# Patient Record
Sex: MALE | Race: WHITE | URBAN - METROPOLITAN AREA
[De-identification: names, ages, dates, MRNs, and addresses within clinical notes are randomized per-mention and may not be internally consistent; named-entity substitution may affect disease eponyms.]

---

## 2023-07-18 ENCOUNTER — OFFICE (OUTPATIENT)
Dept: URBAN - METROPOLITAN AREA CLINIC 104 | Facility: CLINIC | Age: 83
Setting detail: OPHTHALMOLOGY
End: 2023-07-18
Payer: MEDICARE

## 2023-07-18 DIAGNOSIS — H01.002: ICD-10-CM

## 2023-07-18 DIAGNOSIS — H01.005: ICD-10-CM

## 2023-07-18 DIAGNOSIS — H01.001: ICD-10-CM

## 2023-07-18 DIAGNOSIS — Z79.84: ICD-10-CM

## 2023-07-18 DIAGNOSIS — E11.9: ICD-10-CM

## 2023-07-18 DIAGNOSIS — H35.40: ICD-10-CM

## 2023-07-18 DIAGNOSIS — Z96.1: ICD-10-CM

## 2023-07-18 DIAGNOSIS — H01.004: ICD-10-CM

## 2023-07-18 DIAGNOSIS — H43.813: ICD-10-CM

## 2023-07-18 PROCEDURE — 92014 COMPRE OPH EXAM EST PT 1/>: CPT | Performed by: SPECIALIST

## 2023-07-18 ASSESSMENT — KERATOMETRY
OS_K2POWER_DIOPTERS: 44.58
OD_K1POWER_DIOPTERS: 44.18
OD_AXISANGLE_DEGREES: 15
OD_K2POWER_DIOPTERS: 45.42
OS_AXISANGLE_DEGREES: 122
OS_K1POWER_DIOPTERS: 44.00

## 2023-07-18 ASSESSMENT — CONFRONTATIONAL VISUAL FIELD TEST (CVF)
OS_FINDINGS: FULL
OD_FINDINGS: FULL

## 2023-07-18 ASSESSMENT — REFRACTION_CURRENTRX
OD_SPHERE: +2.50
OD_OVR_VA: 20/
OS_SPHERE: +2.50
OS_OVR_VA: 20/
OS_VPRISM_DIRECTION: SV
OD_VPRISM_DIRECTION: SV

## 2023-07-18 ASSESSMENT — SPHEQUIV_DERIVED
OD_SPHEQUIV: 0
OS_SPHEQUIV: 0.25

## 2023-07-18 ASSESSMENT — VISUAL ACUITY
OD_BCVA: 20/20
OS_BCVA: 20/25

## 2023-07-18 ASSESSMENT — REFRACTION_AUTOREFRACTION
OS_AXIS: 74
OD_AXIS: 140
OS_CYLINDER: -1.00
OD_SPHERE: +0.25
OS_SPHERE: +0.75
OD_CYLINDER: -0.50

## 2023-07-18 ASSESSMENT — LID EXAM ASSESSMENTS
OS_BLEPHARITIS: LLL LUL 2+
OD_BLEPHARITIS: RLL RUL 2+

## 2023-07-18 ASSESSMENT — TONOMETRY
OD_IOP_MMHG: 15
OS_IOP_MMHG: 15

## 2023-07-18 ASSESSMENT — AXIALLENGTH_DERIVED
OS_AL: 23.21
OD_AL: 23.12

## 2024-03-25 ENCOUNTER — OFFICE (OUTPATIENT)
Dept: URBAN - METROPOLITAN AREA CLINIC 104 | Facility: CLINIC | Age: 84
Setting detail: OPHTHALMOLOGY
End: 2024-03-25
Payer: MEDICARE

## 2024-03-25 DIAGNOSIS — Z96.1: ICD-10-CM

## 2024-03-25 DIAGNOSIS — H01.005: ICD-10-CM

## 2024-03-25 DIAGNOSIS — Z79.84: ICD-10-CM

## 2024-03-25 DIAGNOSIS — H53.2: ICD-10-CM

## 2024-03-25 DIAGNOSIS — H43.813: ICD-10-CM

## 2024-03-25 DIAGNOSIS — H01.004: ICD-10-CM

## 2024-03-25 DIAGNOSIS — H01.001: ICD-10-CM

## 2024-03-25 DIAGNOSIS — H35.40: ICD-10-CM

## 2024-03-25 DIAGNOSIS — E11.9: ICD-10-CM

## 2024-03-25 DIAGNOSIS — H01.002: ICD-10-CM

## 2024-03-25 PROCEDURE — 99214 OFFICE O/P EST MOD 30 MIN: CPT | Performed by: SPECIALIST

## 2024-03-25 ASSESSMENT — LID EXAM ASSESSMENTS
OS_BLEPHARITIS: LLL LUL 2+
OD_BLEPHARITIS: RLL RUL 2+

## 2024-03-25 ASSESSMENT — REFRACTION_CURRENTRX
OD_OVR_VA: 20/
OS_OVR_VA: 20/

## 2024-04-01 PROBLEM — H53.2 DIPLOPIA: Status: ACTIVE | Noted: 2024-03-25

## 2024-04-04 ENCOUNTER — OFFICE (OUTPATIENT)
Facility: LOCATION | Age: 84
Setting detail: OPHTHALMOLOGY
End: 2024-04-04
Payer: MEDICARE

## 2024-04-04 DIAGNOSIS — H50.22: ICD-10-CM

## 2024-04-04 DIAGNOSIS — H49.12: ICD-10-CM

## 2024-04-04 DIAGNOSIS — E11.9: ICD-10-CM

## 2024-04-04 DIAGNOSIS — H35.033: ICD-10-CM

## 2024-04-04 DIAGNOSIS — H53.2: ICD-10-CM

## 2024-04-04 DIAGNOSIS — H53.433: ICD-10-CM

## 2024-04-04 DIAGNOSIS — Z79.84: ICD-10-CM

## 2024-04-04 DIAGNOSIS — H43.813: ICD-10-CM

## 2024-04-04 DIAGNOSIS — H35.40: ICD-10-CM

## 2024-04-04 PROCEDURE — 92250 FUNDUS PHOTOGRAPHY W/I&R: CPT | Performed by: OPHTHALMOLOGY

## 2024-04-04 PROCEDURE — 92060 SENSORIMOTOR EXAMINATION: CPT | Performed by: OPHTHALMOLOGY

## 2024-04-04 PROCEDURE — 92083 EXTENDED VISUAL FIELD XM: CPT | Performed by: OPHTHALMOLOGY

## 2024-04-04 PROCEDURE — 92014 COMPRE OPH EXAM EST PT 1/>: CPT | Performed by: OPHTHALMOLOGY

## 2024-04-04 ASSESSMENT — LID EXAM ASSESSMENTS
OS_BLEPHARITIS: LLL LUL 2+
OD_BLEPHARITIS: RLL RUL 2+

## 2024-04-18 ENCOUNTER — OFFICE (OUTPATIENT)
Facility: LOCATION | Age: 84
Setting detail: OPHTHALMOLOGY
End: 2024-04-18
Payer: MEDICARE

## 2024-04-18 DIAGNOSIS — H53.2: ICD-10-CM

## 2024-04-18 DIAGNOSIS — E11.9: ICD-10-CM

## 2024-04-18 DIAGNOSIS — H52.4: ICD-10-CM

## 2024-04-18 DIAGNOSIS — H50.22: ICD-10-CM

## 2024-04-18 DIAGNOSIS — H49.12: ICD-10-CM

## 2024-04-18 DIAGNOSIS — Z79.84: ICD-10-CM

## 2024-04-18 PROBLEM — H53.433 ARCUATE DEFECT; BOTH EYES: Status: ACTIVE | Noted: 2024-04-04

## 2024-04-18 PROBLEM — H35.033 HYPERTENSIVE RETINOPATHY; BOTH EYES: Status: ACTIVE | Noted: 2024-04-04

## 2024-04-18 PROCEDURE — 99213 OFFICE O/P EST LOW 20 MIN: CPT | Performed by: OPHTHALMOLOGY

## 2024-04-18 PROCEDURE — 92015 DETERMINE REFRACTIVE STATE: CPT | Performed by: OPHTHALMOLOGY

## 2024-04-18 PROCEDURE — 92060 SENSORIMOTOR EXAMINATION: CPT | Performed by: OPHTHALMOLOGY

## 2024-04-18 ASSESSMENT — LID EXAM ASSESSMENTS
OS_BLEPHARITIS: LLL LUL 2+
OD_BLEPHARITIS: RLL RUL 2+

## 2024-05-23 ENCOUNTER — OFFICE (OUTPATIENT)
Facility: LOCATION | Age: 84
Setting detail: OPHTHALMOLOGY
End: 2024-05-23
Payer: MEDICARE

## 2024-05-23 DIAGNOSIS — H50.22: ICD-10-CM

## 2024-05-23 DIAGNOSIS — H49.12: ICD-10-CM

## 2024-05-23 DIAGNOSIS — H53.2: ICD-10-CM

## 2024-05-23 DIAGNOSIS — Z96.1: ICD-10-CM

## 2024-05-23 PROBLEM — H52.223 ASTIGMATISM, REGULAR; BOTH EYES: Status: ACTIVE | Noted: 2024-05-23

## 2024-05-23 PROCEDURE — 99213 OFFICE O/P EST LOW 20 MIN: CPT | Performed by: OPHTHALMOLOGY

## 2024-05-23 ASSESSMENT — LID EXAM ASSESSMENTS
OD_BLEPHARITIS: RLL RUL 2+
OS_BLEPHARITIS: LLL LUL 2+

## 2024-07-23 ENCOUNTER — OFFICE (OUTPATIENT)
Dept: URBAN - METROPOLITAN AREA CLINIC 104 | Facility: CLINIC | Age: 84
Setting detail: OPHTHALMOLOGY
End: 2024-07-23
Payer: MEDICARE

## 2024-07-23 DIAGNOSIS — H01.005: ICD-10-CM

## 2024-07-23 DIAGNOSIS — Z96.1: ICD-10-CM

## 2024-07-23 DIAGNOSIS — H01.002: ICD-10-CM

## 2024-07-23 DIAGNOSIS — E11.9: ICD-10-CM

## 2024-07-23 DIAGNOSIS — H01.004: ICD-10-CM

## 2024-07-23 DIAGNOSIS — H43.813: ICD-10-CM

## 2024-07-23 DIAGNOSIS — H35.033: ICD-10-CM

## 2024-07-23 DIAGNOSIS — H01.001: ICD-10-CM

## 2024-07-23 PROCEDURE — 92014 COMPRE OPH EXAM EST PT 1/>: CPT | Performed by: SPECIALIST

## 2024-07-23 ASSESSMENT — CONFRONTATIONAL VISUAL FIELD TEST (CVF)
OD_FINDINGS: FULL
OS_FINDINGS: FULL

## 2024-07-23 ASSESSMENT — LID EXAM ASSESSMENTS
OD_BLEPHARITIS: RLL RUL 2+
OS_BLEPHARITIS: LLL LUL 2+

## 2024-08-01 ENCOUNTER — OFFICE (OUTPATIENT)
Facility: LOCATION | Age: 84
Setting detail: OPHTHALMOLOGY
End: 2024-08-01
Payer: MEDICARE

## 2024-08-01 DIAGNOSIS — H50.22: ICD-10-CM

## 2024-08-01 DIAGNOSIS — H49.12: ICD-10-CM

## 2024-08-01 DIAGNOSIS — H53.2: ICD-10-CM

## 2024-08-01 PROCEDURE — 92060 SENSORIMOTOR EXAMINATION: CPT | Performed by: OPHTHALMOLOGY

## 2024-08-01 PROCEDURE — 92012 INTRM OPH EXAM EST PATIENT: CPT | Performed by: OPHTHALMOLOGY

## 2024-08-01 ASSESSMENT — LID EXAM ASSESSMENTS
OS_BLEPHARITIS: LLL LUL 2+
OD_BLEPHARITIS: RLL RUL 2+

## 2024-08-01 ASSESSMENT — CONFRONTATIONAL VISUAL FIELD TEST (CVF)
OS_FINDINGS: FULL
OD_FINDINGS: FULL

## 2024-10-10 ENCOUNTER — OFFICE (OUTPATIENT)
Facility: LOCATION | Age: 84
Setting detail: OPHTHALMOLOGY
End: 2024-10-10
Payer: MEDICARE

## 2024-10-10 DIAGNOSIS — H53.2: ICD-10-CM

## 2024-10-10 DIAGNOSIS — H49.12: ICD-10-CM

## 2024-10-10 DIAGNOSIS — H50.22: ICD-10-CM

## 2024-10-10 PROCEDURE — 92060 SENSORIMOTOR EXAMINATION: CPT | Performed by: OPHTHALMOLOGY

## 2024-10-10 PROCEDURE — 99213 OFFICE O/P EST LOW 20 MIN: CPT | Performed by: OPHTHALMOLOGY

## 2024-10-10 ASSESSMENT — REFRACTION_AUTOREFRACTION
OD_CYLINDER: -0.75
OD_AXIS: 137
OS_SPHERE: -0.50
OD_SPHERE: +0.75
OS_AXIS: 024
OS_CYLINDER: -0.75

## 2024-10-10 ASSESSMENT — REFRACTION_CURRENTRX
OS_AXIS: 024
OD_VPRISM_DIRECTION: SV
OS_CYLINDER: -0.75
OS_VPRISM_DIRECTION: SV
OD_CYLINDER: -0.75
OD_AXIS: 137
OS_OVR_VA: 20/
OS_VPRISM_DIRECTION: SV
OD_OVR_VA: 20/
OS_SPHERE: +2.50
OD_SPHERE: +2.50
OS_OVR_VA: 20/
OS_CYLINDER: -0.25
OD_VPRISM_DIRECTION: SV
OD_SPHERE: +0.50
OS_SPHERE: -0.50
OS_AXIS: 176
OD_OVR_VA: 20/

## 2024-10-10 ASSESSMENT — REFRACTION_TRIALFRAME
OS_ADD: +2.50
OS_VPRISM: BD
OD_ADD: +2.50
OS_SPHERE: PLANO
OD_SPHERE: PLANO
OD_VA1: 20/20
OS_VA1: 20/20

## 2024-10-10 ASSESSMENT — REFRACTION_MANIFEST
OS_VPRISM: BD
OD_AXIS: 135
OD_VPRISM: BU
OD_VA1: 20/20
OS_SPHERE: -0.50
OS_CYLINDER: -0.75
OS_VA1: 20/20
OS_AXIS: 020
OD_SPHERE: +0.50
OD_CYLINDER: -0.75

## 2024-10-10 ASSESSMENT — KERATOMETRY
METHOD_AUTO_MANUAL: AUTO
OS_K2POWER_DIOPTERS: 45.50
OD_K2POWER_DIOPTERS: 45.75
OD_K1POWER_DIOPTERS: 44.75
OS_AXISANGLE_DEGREES: 079
OD_AXISANGLE_DEGREES: 077
OS_K1POWER_DIOPTERS: 45.00

## 2024-10-10 ASSESSMENT — LID EXAM ASSESSMENTS
OD_BLEPHARITIS: RLL RUL 2+
OS_BLEPHARITIS: LLL LUL 2+

## 2024-10-10 ASSESSMENT — VISUAL ACUITY
OS_BCVA: 20/20
OD_BCVA: 20/20

## 2025-05-01 PROBLEM — Z00.00 ENCOUNTER FOR PREVENTIVE HEALTH EXAMINATION: Status: ACTIVE | Noted: 2025-05-01

## 2025-05-07 ENCOUNTER — APPOINTMENT (OUTPATIENT)
Dept: SURGICAL ONCOLOGY | Facility: CLINIC | Age: 85
End: 2025-05-07
Payer: MEDICARE

## 2025-05-07 VITALS
BODY MASS INDEX: 28.16 KG/M2 | HEART RATE: 66 BPM | SYSTOLIC BLOOD PRESSURE: 150 MMHG | WEIGHT: 169 LBS | DIASTOLIC BLOOD PRESSURE: 60 MMHG | HEIGHT: 65 IN | OXYGEN SATURATION: 97 %

## 2025-05-07 DIAGNOSIS — Z78.9 OTHER SPECIFIED HEALTH STATUS: ICD-10-CM

## 2025-05-07 DIAGNOSIS — Z86.39 PERSONAL HISTORY OF OTHER ENDOCRINE, NUTRITIONAL AND METABOLIC DISEASE: ICD-10-CM

## 2025-05-07 DIAGNOSIS — Z86.79 PERSONAL HISTORY OF OTHER DISEASES OF THE CIRCULATORY SYSTEM: ICD-10-CM

## 2025-05-07 DIAGNOSIS — D03.59 MELANOMA IN SITU OF OTHER PART OF TRUNK: ICD-10-CM

## 2025-05-07 DIAGNOSIS — C43.59 MALIGNANT MELANOMA OF OTHER PART OF TRUNK: ICD-10-CM

## 2025-05-07 PROCEDURE — 99205 OFFICE O/P NEW HI 60 MIN: CPT

## 2025-05-07 RX ORDER — HYDROCHLOROTHIAZIDE 25 MG/1
25 TABLET ORAL
Refills: 0 | Status: ACTIVE | COMMUNITY

## 2025-05-07 RX ORDER — HYDRALAZINE/HYDROCHLOROTHIAZID 50 MG-50MG
CAPSULE ORAL
Refills: 0 | Status: ACTIVE | COMMUNITY

## 2025-05-07 RX ORDER — ASPIRIN 81 MG
81 TABLET,CHEWABLE ORAL
Refills: 0 | Status: ACTIVE | COMMUNITY

## 2025-05-07 RX ORDER — PANTOPRAZOLE 40 MG/1
40 TABLET, DELAYED RELEASE ORAL
Refills: 0 | Status: ACTIVE | COMMUNITY

## 2025-05-07 RX ORDER — LABETALOL HYDROCHLORIDE 100 MG/1
100 TABLET, FILM COATED ORAL
Refills: 0 | Status: ACTIVE | COMMUNITY

## 2025-05-07 RX ORDER — VALSARTAN 40 MG/1
TABLET, COATED ORAL
Refills: 0 | Status: ACTIVE | COMMUNITY

## 2025-05-09 ENCOUNTER — OUTPATIENT (OUTPATIENT)
Dept: OUTPATIENT SERVICES | Facility: HOSPITAL | Age: 85
LOS: 1 days | End: 2025-05-09
Payer: MEDICARE

## 2025-05-09 ENCOUNTER — RESULT REVIEW (OUTPATIENT)
Age: 85
End: 2025-05-09

## 2025-05-09 DIAGNOSIS — C80.1 MALIGNANT (PRIMARY) NEOPLASM, UNSPECIFIED: ICD-10-CM

## 2025-05-09 PROCEDURE — 88321 CONSLTJ&REPRT SLD PREP ELSWR: CPT

## 2025-05-12 LAB — SURGICAL PATHOLOGY STUDY: SIGNIFICANT CHANGE UP

## 2025-05-19 RX ORDER — SODIUM CHLORIDE 9 G/1000ML
1000 INJECTION, SOLUTION INTRAVENOUS
Refills: 0 | Status: DISCONTINUED | OUTPATIENT
Start: 2025-05-20 | End: 2025-06-03

## 2025-05-20 ENCOUNTER — TRANSCRIPTION ENCOUNTER (OUTPATIENT)
Age: 85
End: 2025-05-20

## 2025-05-20 ENCOUNTER — APPOINTMENT (OUTPATIENT)
Dept: SURGICAL ONCOLOGY | Facility: HOSPITAL | Age: 85
End: 2025-05-20

## 2025-05-20 ENCOUNTER — OUTPATIENT (OUTPATIENT)
Dept: OUTPATIENT SERVICES | Facility: HOSPITAL | Age: 85
LOS: 1 days | End: 2025-05-20
Payer: MEDICARE

## 2025-05-20 VITALS
DIASTOLIC BLOOD PRESSURE: 66 MMHG | HEIGHT: 65 IN | HEART RATE: 65 BPM | SYSTOLIC BLOOD PRESSURE: 171 MMHG | RESPIRATION RATE: 15 BRPM | WEIGHT: 167.99 LBS | OXYGEN SATURATION: 98 % | TEMPERATURE: 97 F

## 2025-05-20 VITALS
DIASTOLIC BLOOD PRESSURE: 63 MMHG | OXYGEN SATURATION: 99 % | TEMPERATURE: 97 F | HEART RATE: 64 BPM | SYSTOLIC BLOOD PRESSURE: 152 MMHG | RESPIRATION RATE: 18 BRPM

## 2025-05-20 DIAGNOSIS — Z90.49 ACQUIRED ABSENCE OF OTHER SPECIFIED PARTS OF DIGESTIVE TRACT: Chronic | ICD-10-CM

## 2025-05-20 DIAGNOSIS — T14.8XXA OTHER INJURY OF UNSPECIFIED BODY REGION, INITIAL ENCOUNTER: ICD-10-CM

## 2025-05-20 DIAGNOSIS — C43.9 MALIGNANT MELANOMA OF SKIN, UNSPECIFIED: ICD-10-CM

## 2025-05-20 DIAGNOSIS — C43.59 MALIGNANT MELANOMA OF OTHER PART OF TRUNK: ICD-10-CM

## 2025-05-20 DIAGNOSIS — Z98.890 OTHER SPECIFIED POSTPROCEDURAL STATES: Chronic | ICD-10-CM

## 2025-05-20 LAB
GLUCOSE BLDC GLUCOMTR-MCNC: 134 MG/DL — HIGH (ref 70–99)
GLUCOSE BLDC GLUCOMTR-MCNC: 149 MG/DL — HIGH (ref 70–99)
GLUCOSE BLDC GLUCOMTR-MCNC: 205 MG/DL — HIGH (ref 70–99)

## 2025-05-20 PROCEDURE — 38790 INJECT FOR LYMPHATIC X-RAY: CPT | Mod: 50,XU

## 2025-05-20 PROCEDURE — 88305 TISSUE EXAM BY PATHOLOGIST: CPT

## 2025-05-20 PROCEDURE — 88342 IMHCHEM/IMCYTCHM 1ST ANTB: CPT | Mod: 26

## 2025-05-20 PROCEDURE — 10140 I&D HMTMA SEROMA/FLUID COLLJ: CPT | Mod: XU

## 2025-05-20 PROCEDURE — A9541: CPT

## 2025-05-20 PROCEDURE — C9399: CPT

## 2025-05-20 PROCEDURE — 21936 RESECT BACK TUM 5 CM/>: CPT

## 2025-05-20 PROCEDURE — 38792 RA TRACER ID OF SENTINL NODE: CPT | Mod: 50,XU

## 2025-05-20 PROCEDURE — 88307 TISSUE EXAM BY PATHOLOGIST: CPT

## 2025-05-20 PROCEDURE — 88305 TISSUE EXAM BY PATHOLOGIST: CPT | Mod: 26

## 2025-05-20 PROCEDURE — 38900 IO MAP OF SENT LYMPH NODE: CPT | Mod: 50

## 2025-05-20 PROCEDURE — 38900 IO MAP OF SENT LYMPH NODE: CPT | Mod: AS,50

## 2025-05-20 PROCEDURE — ZZZZZ: CPT

## 2025-05-20 PROCEDURE — 38510 BIOPSY/REMOVAL LYMPH NODES: CPT

## 2025-05-20 PROCEDURE — 88307 TISSUE EXAM BY PATHOLOGIST: CPT | Mod: 26

## 2025-05-20 PROCEDURE — 14001 TIS TRNFR TRUNK 10.1-30SQCM: CPT

## 2025-05-20 PROCEDURE — 38510 BIOPSY/REMOVAL LYMPH NODES: CPT | Mod: 50

## 2025-05-20 PROCEDURE — 38308 INCISION OF LYMPH CHANNELS: CPT | Mod: AS

## 2025-05-20 PROCEDURE — C1889: CPT

## 2025-05-20 PROCEDURE — 38308 INCISION OF LYMPH CHANNELS: CPT

## 2025-05-20 PROCEDURE — 78195 LYMPH SYSTEM IMAGING: CPT

## 2025-05-20 PROCEDURE — 88342 IMHCHEM/IMCYTCHM 1ST ANTB: CPT

## 2025-05-20 PROCEDURE — 88341 IMHCHEM/IMCYTCHM EA ADD ANTB: CPT

## 2025-05-20 PROCEDURE — 21936 RESECT BACK TUM 5 CM/>: CPT | Mod: AS

## 2025-05-20 PROCEDURE — 78195 LYMPH SYSTEM IMAGING: CPT | Mod: 26

## 2025-05-20 PROCEDURE — 82962 GLUCOSE BLOOD TEST: CPT

## 2025-05-20 PROCEDURE — 88341 IMHCHEM/IMCYTCHM EA ADD ANTB: CPT | Mod: 26

## 2025-05-20 DEVICE — SURGICEL FIBRILLAR 4 X 4": Type: IMPLANTABLE DEVICE | Status: FUNCTIONAL

## 2025-05-20 DEVICE — AGENT HEMOSTATIC HEMOBLAST 1.65G 10CM: Type: IMPLANTABLE DEVICE | Status: FUNCTIONAL

## 2025-05-20 DEVICE — CLIP APPLIER ETHICON LIGACLIP 11.5" MEDIUM: Type: IMPLANTABLE DEVICE | Status: FUNCTIONAL

## 2025-05-20 RX ORDER — ONDANSETRON HCL/PF 4 MG/2 ML
4 VIAL (ML) INJECTION ONCE
Refills: 0 | Status: DISCONTINUED | OUTPATIENT
Start: 2025-05-20 | End: 2025-05-20

## 2025-05-20 RX ORDER — MELATONIN 5 MG
1 TABLET ORAL
Refills: 0 | DISCHARGE

## 2025-05-20 RX ORDER — SODIUM CHLORIDE 9 G/1000ML
1000 INJECTION, SOLUTION INTRAVENOUS
Refills: 0 | Status: DISCONTINUED | OUTPATIENT
Start: 2025-05-20 | End: 2025-05-20

## 2025-05-20 RX ORDER — HYDROMORPHONE/SOD CHLOR,ISO/PF 2 MG/10 ML
0.25 SYRINGE (ML) INJECTION
Refills: 0 | Status: DISCONTINUED | OUTPATIENT
Start: 2025-05-20 | End: 2025-05-20

## 2025-05-20 RX ORDER — ASPIRIN 325 MG
1 TABLET ORAL
Refills: 0 | DISCHARGE

## 2025-05-20 RX ORDER — CEFADROXIL 500 MG/1
1 CAPSULE ORAL
Qty: 14 | Refills: 0
Start: 2025-05-20 | End: 2025-05-26

## 2025-05-20 RX ORDER — HYDROMORPHONE/SOD CHLOR,ISO/PF 2 MG/10 ML
0.5 SYRINGE (ML) INJECTION
Refills: 0 | Status: DISCONTINUED | OUTPATIENT
Start: 2025-05-20 | End: 2025-05-20

## 2025-05-20 RX ORDER — B1/B2/B3/B5/B6/B12/VIT C/FOLIC 500-0.5 MG
0 TABLET ORAL
Refills: 0 | DISCHARGE

## 2025-05-20 RX ORDER — ACETAMINOPHEN 500 MG/5ML
2 LIQUID (ML) ORAL
Qty: 0 | Refills: 0 | DISCHARGE

## 2025-05-20 RX ORDER — B1/B2/B3/B5/B6/B12/VIT C/FOLIC 500-0.5 MG
1 TABLET ORAL
Refills: 0 | DISCHARGE

## 2025-05-20 RX ORDER — LABETALOL HYDROCHLORIDE 200 MG/1
1 TABLET, FILM COATED ORAL
Refills: 0 | DISCHARGE

## 2025-05-20 RX ADMIN — SODIUM CHLORIDE 50 MILLILITER(S): 9 INJECTION, SOLUTION INTRAVENOUS at 07:30

## 2025-05-20 NOTE — PROGRESS NOTE ADULT - ASSESSMENT
84 yo male with pmhx of HTN, DM, here for excision of melanoma from back with Neck lymph node biopsy and return to OR for evacuation of hematoma.  Pt currently in PACU, would like to go home and feels comfortable going home.  Family at bedside will be with him this evening.

## 2025-05-20 NOTE — ASU DISCHARGE PLAN (ADULT/PEDIATRIC) - NS MD DC FALL RISK RISK
For information on Fall & Injury Prevention, visit: [email or web address] OR  [email or web address] OR  https://www.cdc.gov/steadi/patient.html

## 2025-05-20 NOTE — H&P ADULT - NSHPPHYSICALEXAM_GEN_ALL_CORE
T(C): 36.6 (05-20-25 @ 14:25), Max: 36.6 (05-20-25 @ 14:25)  HR: 60 (05-20-25 @ 14:25) (55 - 63)  BP: 170/69 (05-20-25 @ 14:25) (160/68 - 178/70)  RR: 14 (05-20-25 @ 14:25) (12 - 15)  SpO2: 99% (05-20-25 @ 14:25) (98% - 100%)    CONSTITUTIONAL: Well groomed, no apparent distress  EYES: PERRLA and symmetric, EOMI, No conjunctival or scleral injection, non-icteric  ENMT: Oral mucosa with moist membranes. Normal dentition; no pharyngeal injection or exudates             NECK: Supple, symmetric and without tracheal deviation   RESP: No respiratory distress, no use of accessory muscles; CTA b/l, no WRR  CV: RRR, +S1S2, no MRG; no JVD; no peripheral edema  GI: Soft, NT, ND, no rebound, no guarding; no palpable masses; no hepatosplenomegaly; no hernia palpated  LYMPH: No cervical LAD or tenderness; no axillary LAD or tenderness; no inguinal LAD or tenderness  MSK: Normal gait; No digital clubbing or cyanosis; examination of the (head/neck/spine/ribs/pelvis, RUE, LUE, RLE, LLE) without misalignment,            Normal ROM without pain, no spinal tenderness, normal muscle strength/tone  SKIN: No rashes or ulcers noted; no subcutaneous nodules or induration palpable  NEURO: CN II-XII intact; normal reflexes in upper and lower extremities, sensation intact in upper and lower extremities b/l to light touch   PSYCH: Appropriate insight/judgment; A+O x 3, mood and affect appropriate, recent/remote memory intact

## 2025-05-20 NOTE — ASU DISCHARGE PLAN (ADULT/PEDIATRIC) - PROCEDURE
Wide excision of left upper back melanoma with left supraclavicular sentinel node biopsy Wide excision of left upper back melanoma with left supraclavicular sentinel node biopsy, with return to OR for hematoma evacuation.

## 2025-05-20 NOTE — CHART NOTE - NSCHARTNOTEFT_GEN_A_CORE
Called by ASU nurse to evaluate bleeding from back dressing and swelling to surrounding area.  Evaluated pt, pt denies any discomfort to surgical site. Denies n/v, sob, cp, dizziness.    On exam, noted that steri strips are saturated with swelling to surrounding tissue. Steri strips removed and noted ecchymosis along incision and active oozing from incision. Surrounding tissue soft but swollen, non ttp.   VSS, AF    Pressure dressing applied, discussed w/ Dr. Da Silva and decision made with patient/family to bring back to OR for evacuation of hematoma, hemostasis, washout.    Anesthesia aware.    Seen & discussed w/ Dr. Da Silva

## 2025-05-20 NOTE — BRIEF OPERATIVE NOTE - NSICDXBRIEFPROCEDURE_GEN_ALL_CORE_FT
PROCEDURES:  Wide excision, melanoma, torso, with sentinel lymph node biopsy 20-May-2025 13:23:45 left supraclavicular nodes Zoila Trivedi  
PROCEDURES:  Evacuation of hematoma of torso 20-May-2025 19:03:45 upper back Doug Thurman

## 2025-05-20 NOTE — BRIEF OPERATIVE NOTE - NSICDXBRIEFPOSTOP_GEN_ALL_CORE_FT
POST-OP DIAGNOSIS:  Melanoma of skin 20-May-2025 13:25:04 left upper back Zoila Trivedi  
POST-OP DIAGNOSIS:  Melanoma of skin 20-May-2025 13:25:04 left upper back Zoila Trivedi

## 2025-05-20 NOTE — ASU PREOP CHECKLIST - WARM FLUIDS/WARM BLANKETS
Airway    Date/Time: 6/27/2023 10:30 AM    Performed by: Jaren Wells D.O.  Authorized by: Jaren Wells D.O.    Location:  OR  Urgency:  Elective  Difficult Airway: No    Indications for Airway Management:  Anesthesia      Spontaneous Ventilation: absent    Sedation Level:  Deep  Preoxygenated: Yes    Final Airway Type:  Supraglottic airway  Final Supraglottic Airway:  Standard LMA    SGA Size:  3  Number of Attempts at Approach:  1         no

## 2025-05-20 NOTE — ASU DISCHARGE PLAN (ADULT/PEDIATRIC) - CARE PROVIDER_API CALL
Jorgito Da Silva [facility]  [address] [place] [id number]  Phone: [phone]  Fax: [phone]  Follow Up Time:

## 2025-05-20 NOTE — BRIEF OPERATIVE NOTE - NSICDXBRIEFPREOP_GEN_ALL_CORE_FT
PRE-OP DIAGNOSIS:  Melanoma of skin 20-May-2025 13:24:34 left upper back Zoila Trivedi  
PRE-OP DIAGNOSIS:  Melanoma of skin 20-May-2025 13:24:34 left upper back Zoila Trivedi

## 2025-05-20 NOTE — H&P ADULT - ASSESSMENT
84 yo male scheduled for excision of upper back lesion with axillary sentinel node biopsy. Pt stopped taking ASA last Tuesday  pt states he is in his usual state of health    pt was seen by PMD Dr. Ochoa on 5/12/25, pt was medically optimized for the aforementioned procedure

## 2025-05-20 NOTE — ASU DISCHARGE PLAN (ADULT/PEDIATRIC) - FINANCIAL ASSISTANCE
[facility] provides services at a reduced cost to those who are determined to be eligible through [facility]’s financial assistance program. Information regarding [facility]’s financial assistance program can be found by going to [email or web address] or by calling [phone].

## 2025-05-20 NOTE — H&P ADULT - NSHPLABSRESULTS_GEN_ALL_CORE
86 yo male scheduled for excision of upper back lesion with axillary sentinel node biopsy. Pt stopped taking ASA last Tuesday  pt states he is in his usual state of health labs in paper chart  cbc, bmp and ekg wnl

## 2025-05-20 NOTE — ASU DISCHARGE PLAN (ADULT/PEDIATRIC) - ASU DC SPECIAL INSTRUCTIONSFT
You may shower tomorrow 5/21/25 over white steri-strips; gently pat incisions dry.  Follow-up Dr. Da Silva in office in 2-3 weeks; please call for appointment.  Take Tylenol (500mg) 2 tabs by mouth every 8 hours as needed for discomfort.  Apply ice/cold pack to surgical areas as needed for discomfort/swelling. Pt to follow up with Dr. Da Silva on Friday May 23, 2025  Do not shower.  May sponge bathe only  Empty and record Drain output.    Take Tylenol (500mg) 2 tabs by mouth every 8 hours as needed for discomfort.  Apply ice/cold pack to surgical areas as needed for discomfort/swelling.  No strenuous activity, no heavy lifting  Regular diet.

## 2025-05-20 NOTE — PROGRESS NOTE ADULT - PROBLEM SELECTOR PLAN 1
Plan:  - Continue diet, will advance to regular diet as tolerated.   - Continue abx- sent home on duricef x7days  - Pain control- tylenol at home.   - Encourage ambulation  - Mihir drain, patient and family educated on emptying and recording output.   - Pt to be discharged home.  Will restart home medications this evening.    - Ice packs prn to back/surrounding area.   - Case discussed with Dr Da Silva.  Will follow up with Dr Da Silva on friday May 23, 2025.

## 2025-05-20 NOTE — PROGRESS NOTE ADULT - SUBJECTIVE AND OBJECTIVE BOX
Post Operative Note  Patient: DAVONTE BARKER 85y (1940) Male   MRN: [id number]  Location: [facility] 03  Visit: 05-20-25 Outpatient  Date: 05-20-25 @ 20:20    Procedure: S/P excision of back melanoma with lymph node neck biopsy, with return to or for hematoma evacuation.     Subjective:   Patient seen and examined at bedside, reports is tolerating clear liquids diet.  Pt asked at bedside, would feel more comfortable going home tonight.  Physician and staff are aware.   Patient denies dizziness, chest pain, sob, nausea, vomiting, abdominal pain, or urinary complaints.  Elevated blood pressure noted, but patient has not taken his Blood pressure medications in over 24 hour period.     Objective:  Vitals: T(F): 96.8 (05-20-25 @ 18:55), Max: 97.8 (05-20-25 @ 14:25)  HR: 59 (05-20-25 @ 19:40)  BP: 150/81 (05-20-25 @ 19:40) (150/81 - 178/70)  RR: 14 (05-20-25 @ 19:40)  SpO2: 98% (05-20-25 @ 19:40)  Vent Settings:     In:   05-20-25 @ 07:01  -  05-20-25 @ 20:20  --------------------------------------------------------  IN: 195 mL      IV Fluids: lactated ringers. 1000 milliLiter(s) (50 mL/Hr) IV Continuous <Continuous>  lactated ringers. 1000 milliLiter(s) (75 mL/Hr) IV Continuous <Continuous>      Out:   05-20-25 @ 07:01  -  05-20-25 @ 20:20  --------------------------------------------------------  OUT: 0 mL      EBL:     Voided Urine:   05-20-25 @ 07:01  -  05-20-25 @ 20:20  --------------------------------------------------------  OUT: 0 mL      [place] Catheter:  no   Drains: yes- cami drain to back with bulb suction      GENERAL:  well-developed Male lying comfortably in bed in NAD.  HEENT:  NC/AT. Sclera white. Mucous membranes moist.    CARDIO:  Regular rate and rhythm.  No murmur, gallop or rub appreciated.  RESPIRATORY:  Nonlabored breathing, no accessory muscle use. Lungs clear to auscultation bilaterally.  No wheezing, rales or rhonchi appreciated.  ABDOMEN:  Soft, nondistended, nontender. BS appreciated on auscultation.  No rebound tenderness or guarding.  EXTREMITIES: No calf tenderness bilaterally.  SKIN:  Back covered in surgical dressing- with drain tubing extending out of the bottom of the dressing.   Surgical dressings clean, dry, intact.    Medications: [Standing]  HYDROmorphone  Injectable 0.25 milliGRAM(s) IV Push every 10 minutes PRN  HYDROmorphone  Injectable 0.5 milliGRAM(s) IV Push every 10 minutes PRN  lactated ringers. 1000 milliLiter(s) IV Continuous <Continuous>  lactated ringers. 1000 milliLiter(s) IV Continuous <Continuous>  ondansetron Injectable 4 milliGRAM(s) IV Push once PRN    Medications: [PRN]  HYDROmorphone  Injectable 0.25 milliGRAM(s) IV Push every 10 minutes PRN  HYDROmorphone  Injectable 0.5 milliGRAM(s) IV Push every 10 minutes PRN  lactated ringers. 1000 milliLiter(s) IV Continuous <Continuous>  lactated ringers. 1000 milliLiter(s) IV Continuous <Continuous>  ondansetron Injectable 4 milliGRAM(s) IV Push once PRN    Labs:                Imaging:  No post-op imaging studies    Assessment:  85yMale patient S/P *** for ***

## 2025-05-21 PROBLEM — I10 ESSENTIAL (PRIMARY) HYPERTENSION: Chronic | Status: ACTIVE | Noted: 2025-05-20

## 2025-05-21 PROBLEM — E11.9 TYPE 2 DIABETES MELLITUS WITHOUT COMPLICATIONS: Chronic | Status: ACTIVE | Noted: 2025-05-20

## 2025-05-23 ENCOUNTER — APPOINTMENT (OUTPATIENT)
Dept: SURGICAL ONCOLOGY | Facility: CLINIC | Age: 85
End: 2025-05-23

## 2025-05-23 ENCOUNTER — NON-APPOINTMENT (OUTPATIENT)
Age: 85
End: 2025-05-23

## 2025-05-23 VITALS
SYSTOLIC BLOOD PRESSURE: 171 MMHG | WEIGHT: 170 LBS | BODY MASS INDEX: 28.32 KG/M2 | HEART RATE: 71 BPM | DIASTOLIC BLOOD PRESSURE: 63 MMHG | HEIGHT: 65 IN | OXYGEN SATURATION: 96 % | RESPIRATION RATE: 16 BRPM

## 2025-05-23 DIAGNOSIS — C43.59 MALIGNANT MELANOMA OF OTHER PART OF TRUNK: ICD-10-CM

## 2025-05-23 PROCEDURE — 99024 POSTOP FOLLOW-UP VISIT: CPT

## 2025-06-03 LAB — SURGICAL PATHOLOGY STUDY: SIGNIFICANT CHANGE UP

## 2025-06-04 ENCOUNTER — APPOINTMENT (OUTPATIENT)
Dept: SURGICAL ONCOLOGY | Facility: CLINIC | Age: 85
End: 2025-06-04

## 2025-06-11 ENCOUNTER — NON-APPOINTMENT (OUTPATIENT)
Age: 85
End: 2025-06-11

## 2025-06-11 ENCOUNTER — APPOINTMENT (OUTPATIENT)
Dept: SURGICAL ONCOLOGY | Facility: CLINIC | Age: 85
End: 2025-06-11

## 2025-06-11 VITALS
DIASTOLIC BLOOD PRESSURE: 61 MMHG | BODY MASS INDEX: 27.82 KG/M2 | OXYGEN SATURATION: 97 % | HEART RATE: 52 BPM | HEIGHT: 65 IN | WEIGHT: 167 LBS | SYSTOLIC BLOOD PRESSURE: 168 MMHG | RESPIRATION RATE: 17 BRPM

## 2025-06-11 PROCEDURE — 99024 POSTOP FOLLOW-UP VISIT: CPT

## 2025-06-17 ENCOUNTER — OFFICE (OUTPATIENT)
Dept: URBAN - METROPOLITAN AREA CLINIC 115 | Facility: CLINIC | Age: 85
Setting detail: OPHTHALMOLOGY
End: 2025-06-17
Payer: MEDICARE

## 2025-06-17 DIAGNOSIS — H53.2: ICD-10-CM

## 2025-06-17 DIAGNOSIS — H35.033: ICD-10-CM

## 2025-06-17 DIAGNOSIS — H49.12: ICD-10-CM

## 2025-06-17 DIAGNOSIS — H53.433: ICD-10-CM

## 2025-06-17 DIAGNOSIS — H50.22: ICD-10-CM

## 2025-06-17 DIAGNOSIS — E11.9: ICD-10-CM

## 2025-06-17 DIAGNOSIS — H35.40: ICD-10-CM

## 2025-06-17 DIAGNOSIS — H43.813: ICD-10-CM

## 2025-06-17 PROCEDURE — 92202 OPSCPY EXTND ON/MAC DRAW: CPT | Performed by: OPHTHALMOLOGY

## 2025-06-17 PROCEDURE — 92014 COMPRE OPH EXAM EST PT 1/>: CPT | Performed by: OPHTHALMOLOGY

## 2025-06-17 PROCEDURE — 92133 CPTRZD OPH DX IMG PST SGM ON: CPT | Performed by: OPHTHALMOLOGY

## 2025-06-17 PROCEDURE — 92083 EXTENDED VISUAL FIELD XM: CPT | Performed by: OPHTHALMOLOGY

## 2025-06-17 ASSESSMENT — REFRACTION_CURRENTRX
OD_AXIS: 137
OD_SPHERE: +0.50
OS_AXIS: 024
OD_VPRISM_DIRECTION: SV
OD_CYLINDER: -0.75
OS_VPRISM_DIRECTION: SV
OD_OVR_VA: 20/
OS_OVR_VA: 20/
OD_OVR_VA: 20/
OS_SPHERE: +2.50
OS_CYLINDER: -0.75
OS_SPHERE: -0.50
OS_AXIS: 176
OS_OVR_VA: 20/
OD_SPHERE: +2.50
OS_VPRISM_DIRECTION: SV
OS_CYLINDER: -0.25
OD_VPRISM_DIRECTION: SV

## 2025-06-17 ASSESSMENT — REFRACTION_MANIFEST
OS_AXIS: 020
OD_SPHERE: +0.50
OS_VPRISM: BD
OD_VPRISM: BU
OD_AXIS: 135
OS_CYLINDER: -0.75
OD_CYLINDER: -0.75
OS_VA1: 20/20
OS_SPHERE: -0.50
OD_VA1: 20/20

## 2025-06-17 ASSESSMENT — KERATOMETRY
OS_K1POWER_DIOPTERS: 45.00
OD_K2POWER_DIOPTERS: 45.75
METHOD_AUTO_MANUAL: AUTO
OS_AXISANGLE_DEGREES: 079
OD_AXISANGLE_DEGREES: 077
OS_K2POWER_DIOPTERS: 45.50
OD_K1POWER_DIOPTERS: 44.75

## 2025-06-17 ASSESSMENT — REFRACTION_TRIALFRAME
OS_ADD: +2.50
OD_ADD: +2.50
OS_VPRISM: BD
OD_SPHERE: PLANO
OS_VA1: 20/20
OD_VA1: 20/20
OS_SPHERE: PLANO

## 2025-06-17 ASSESSMENT — REFRACTION_AUTOREFRACTION
OS_SPHERE: 0.00
OS_AXIS: 078
OD_AXIS: 112
OD_SPHERE: +1.25
OS_CYLINDER: -1.25
OD_CYLINDER: -1.00

## 2025-06-17 ASSESSMENT — CONFRONTATIONAL VISUAL FIELD TEST (CVF)
OD_FINDINGS: FULL
OS_FINDINGS: FULL

## 2025-06-17 ASSESSMENT — LID EXAM ASSESSMENTS
OS_BLEPHARITIS: LLL LUL 2+
OD_BLEPHARITIS: RLL RUL 2+

## 2025-06-17 ASSESSMENT — VISUAL ACUITY
OD_BCVA: 20/30-
OS_BCVA: 20/20-1

## 2025-08-05 ENCOUNTER — OFFICE (OUTPATIENT)
Dept: URBAN - METROPOLITAN AREA CLINIC 104 | Facility: CLINIC | Age: 85
Setting detail: OPHTHALMOLOGY
End: 2025-08-05
Payer: MEDICARE

## 2025-08-05 DIAGNOSIS — H49.12: ICD-10-CM

## 2025-08-05 DIAGNOSIS — E11.9: ICD-10-CM

## 2025-08-05 DIAGNOSIS — H35.033: ICD-10-CM

## 2025-08-05 DIAGNOSIS — H01.001: ICD-10-CM

## 2025-08-05 DIAGNOSIS — H01.005: ICD-10-CM

## 2025-08-05 DIAGNOSIS — H01.004: ICD-10-CM

## 2025-08-05 DIAGNOSIS — Z96.1: ICD-10-CM

## 2025-08-05 DIAGNOSIS — H43.813: ICD-10-CM

## 2025-08-05 DIAGNOSIS — H01.002: ICD-10-CM

## 2025-08-05 DIAGNOSIS — H35.40: ICD-10-CM

## 2025-08-05 PROCEDURE — 92014 COMPRE OPH EXAM EST PT 1/>: CPT | Performed by: SPECIALIST

## 2025-08-05 ASSESSMENT — KERATOMETRY
OD_AXISANGLE_DEGREES: 21
OD_K2POWER_DIOPTERS: 45.61
OD_K1POWER_DIOPTERS: 44.70
OS_K1POWER_DIOPTERS: UTP

## 2025-08-05 ASSESSMENT — CONFRONTATIONAL VISUAL FIELD TEST (CVF)
OD_FINDINGS: FULL
OS_FINDINGS: FULL

## 2025-08-05 ASSESSMENT — REFRACTION_AUTOREFRACTION
OD_SPHERE: +0.25
OS_CYLINDER: -1.25
OD_AXIS: 137
OS_SPHERE: +0.25
OD_CYLINDER: -1.00
OS_AXIS: 81

## 2025-08-05 ASSESSMENT — REFRACTION_CURRENTRX
OS_SPHERE: -0.50
OD_CYLINDER: SPHERE
OD_SPHERE: +2.50
OD_AXIS: 138
OS_CYLINDER: -0.75
OD_OVR_VA: 20/
OS_OVR_VA: 20/
OD_CYLINDER: -0.75
OS_CYLINDER: SPHERE
OS_OVR_VA: 20/
OS_SPHERE: +2.75
OS_AXIS: 31
OD_SPHERE: +0.50
OD_OVR_VA: 20/

## 2025-08-05 ASSESSMENT — TONOMETRY
OS_IOP_MMHG: 14
OD_IOP_MMHG: 14

## 2025-08-05 ASSESSMENT — VISUAL ACUITY
OD_BCVA: 20/20-1
OS_BCVA: 20/20-1

## 2025-08-05 ASSESSMENT — LID EXAM ASSESSMENTS
OS_BLEPHARITIS: LLL LUL 2+
OD_BLEPHARITIS: RLL RUL 2+

## 2025-09-12 ENCOUNTER — APPOINTMENT (OUTPATIENT)
Dept: SURGICAL ONCOLOGY | Facility: CLINIC | Age: 85
End: 2025-09-12
Payer: MEDICARE

## 2025-09-12 VITALS
OXYGEN SATURATION: 99 % | SYSTOLIC BLOOD PRESSURE: 150 MMHG | HEIGHT: 65 IN | HEART RATE: 61 BPM | DIASTOLIC BLOOD PRESSURE: 60 MMHG | BODY MASS INDEX: 27.82 KG/M2 | WEIGHT: 167 LBS

## 2025-09-12 DIAGNOSIS — C43.59 MALIGNANT MELANOMA OF OTHER PART OF TRUNK: ICD-10-CM

## 2025-09-12 PROCEDURE — 99215 OFFICE O/P EST HI 40 MIN: CPT

## 2025-09-15 ENCOUNTER — NON-APPOINTMENT (OUTPATIENT)
Age: 85
End: 2025-09-15

## (undated) DEVICE — DRSG ADAPTIC 3 X 8"

## (undated) DEVICE — DRSG TELFA 3 X 8

## (undated) DEVICE — SUT MONOCRYL 4-0 18" P-3 UNDYED

## (undated) DEVICE — SYR LUER LOK 20CC

## (undated) DEVICE — ELCTR ROCKER SWITCH PENCIL BLUE 10FT

## (undated) DEVICE — SUT SILK 2-0 18" FS

## (undated) DEVICE — DRSG CURITY GAUZE SPONGE 4 X 4" 12-PLY

## (undated) DEVICE — PREP CHLORAPREP HI-LITE ORANGE 26ML

## (undated) DEVICE — DRSG COBAN 4"

## (undated) DEVICE — DRSG TEGADERM 6 X 8"

## (undated) DEVICE — WARMING BLANKET LOWER ADULT

## (undated) DEVICE — ELCTR BOVIE TIP BLADE INSULATED 4" EDGE

## (undated) DEVICE — ELCTR GROUNDING PAD ADULT COVIDIEN

## (undated) DEVICE — BLADE SCALPEL SAFETYLOCK #15

## (undated) DEVICE — SUT MONOCRYL 4-0 27" PS-2 UNDYED

## (undated) DEVICE — WARMING BLANKET UPPER ADULT

## (undated) DEVICE — PACK MINOR WITH LAP

## (undated) DEVICE — DRSG KLING 4"

## (undated) DEVICE — DRSG TEGADERM 1.75X1.75"

## (undated) DEVICE — GLV 7 PROTEXIS (WHITE)

## (undated) DEVICE — POSITIONER FOAM EGG CRATE ULNAR 2PCS (PINK)

## (undated) DEVICE — SUT SOFSILK 2-0 18" C-23

## (undated) DEVICE — MARKING PEN W RULER

## (undated) DEVICE — DRAPE TOWEL BLUE 17" X 24"

## (undated) DEVICE — PACK MINOR NO DRAPE

## (undated) DEVICE — SPECIMEN CONTAINER 100ML

## (undated) DEVICE — DRSG STERISTRIPS 0.5 X 4"

## (undated) DEVICE — DRAPE SPLIT SHEET 77" X 108"

## (undated) DEVICE — SUT MONOCRYL 3-0 27" PS-2 UNDYED

## (undated) DEVICE — DRAPE 1/2 SHEET 40X57"

## (undated) DEVICE — SUT MONOCRYL 3-0 18" PS-1

## (undated) DEVICE — SUT POLYSORB 3-0 30" V-20 UNDYED

## (undated) DEVICE — DRSG STOCKINETTE IMPERVIOUS XL 12 X 48"

## (undated) DEVICE — SOL IRR POUR H2O 500ML

## (undated) DEVICE — POSITIONER STRAP ARMBOARD VELCRO TS-30

## (undated) DEVICE — DRSG COMBINE 5 X 9"

## (undated) DEVICE — DRSG XEROFORM 5 X 9"

## (undated) DEVICE — DRSG OPSITE 13.75 X 4"

## (undated) DEVICE — LABELS BLANK W PEN

## (undated) DEVICE — SUT VICRYL 3-0 27" SH

## (undated) DEVICE — STAPLER SKIN VISI-STAT 35 WIDE

## (undated) DEVICE — POSITIONER PATIENT SAFETY STRAP 3X60"

## (undated) DEVICE — VENODYNE/SCD SLEEVE CALF MEDIUM

## (undated) DEVICE — DRAPE LAPAROTOMY TRANSVERSE

## (undated) DEVICE — LIGASURE SMALL JAW

## (undated) DEVICE — SUT CHROMIC 3-0 27" SH

## (undated) DEVICE — DRAPE 3/4 SHEET 52X76"

## (undated) DEVICE — GLV 7.5 PROTEXIS (BLUE)

## (undated) DEVICE — PREP BETADINE KIT

## (undated) DEVICE — GLV 7.5 PROTEXIS (WHITE)

## (undated) DEVICE — SUT VICRYL 2-0 27" CT-1

## (undated) DEVICE — SUT VICRYL 2-0 27" SH UNDYED

## (undated) DEVICE — DRAPE INSTRUMENT POUCH 6.75" X 11"

## (undated) DEVICE — SOL IRR POUR NS 0.9% 500ML

## (undated) DEVICE — DRSG STOCKINETTE IMPERVIOUS MED 8 X 38"